# Patient Record
Sex: FEMALE | URBAN - NONMETROPOLITAN AREA
[De-identification: names, ages, dates, MRNs, and addresses within clinical notes are randomized per-mention and may not be internally consistent; named-entity substitution may affect disease eponyms.]

---

## 2024-07-15 ENCOUNTER — APPOINTMENT (OUTPATIENT)
Dept: CARDIOLOGY | Facility: CLINIC | Age: 39
End: 2024-07-15
Payer: COMMERCIAL

## 2024-07-15 VITALS
DIASTOLIC BLOOD PRESSURE: 98 MMHG | BODY MASS INDEX: 45.65 KG/M2 | HEART RATE: 94 BPM | WEIGHT: 274 LBS | HEIGHT: 65 IN | SYSTOLIC BLOOD PRESSURE: 160 MMHG

## 2024-07-15 DIAGNOSIS — Z86.59 HISTORY OF ANXIETY: ICD-10-CM

## 2024-07-15 DIAGNOSIS — L40.50 PSORIATIC ARTHRITIS (MULTI): ICD-10-CM

## 2024-07-15 DIAGNOSIS — R00.2 PALPITATIONS: ICD-10-CM

## 2024-07-15 DIAGNOSIS — Z87.891 FORMER SMOKER: ICD-10-CM

## 2024-07-15 DIAGNOSIS — I10 HYPERTENSION, UNSPECIFIED TYPE: ICD-10-CM

## 2024-07-15 DIAGNOSIS — E03.9 HYPOTHYROIDISM, UNSPECIFIED TYPE: ICD-10-CM

## 2024-07-15 PROCEDURE — 1036F TOBACCO NON-USER: CPT | Performed by: INTERNAL MEDICINE

## 2024-07-15 PROCEDURE — 93000 ELECTROCARDIOGRAM COMPLETE: CPT | Performed by: INTERNAL MEDICINE

## 2024-07-15 PROCEDURE — 3008F BODY MASS INDEX DOCD: CPT | Performed by: INTERNAL MEDICINE

## 2024-07-15 PROCEDURE — 3077F SYST BP >= 140 MM HG: CPT | Performed by: INTERNAL MEDICINE

## 2024-07-15 PROCEDURE — 3080F DIAST BP >= 90 MM HG: CPT | Performed by: INTERNAL MEDICINE

## 2024-07-15 PROCEDURE — 99204 OFFICE O/P NEW MOD 45 MIN: CPT | Performed by: INTERNAL MEDICINE

## 2024-07-15 RX ORDER — METHOTREXATE 2.5 MG/1
2.5 TABLET ORAL
COMMUNITY
Start: 2024-06-03

## 2024-07-15 RX ORDER — FOLIC ACID 1 MG/1
0.4 TABLET ORAL
COMMUNITY
Start: 2024-06-11

## 2024-07-15 RX ORDER — LANOLIN ALCOHOL/MO/W.PET/CERES
400 CREAM (GRAM) TOPICAL 2 TIMES DAILY
Qty: 180 TABLET | Refills: 3 | Status: SHIPPED | OUTPATIENT
Start: 2024-07-15 | End: 2025-07-15

## 2024-07-15 RX ORDER — METOPROLOL SUCCINATE 50 MG/1
50 TABLET, EXTENDED RELEASE ORAL DAILY
Qty: 90 TABLET | Refills: 3 | Status: SHIPPED | OUTPATIENT
Start: 2024-07-15 | End: 2025-07-15

## 2024-07-15 RX ORDER — LEVOTHYROXINE SODIUM 125 UG/1
125 TABLET ORAL DAILY
COMMUNITY
Start: 2024-06-11

## 2024-07-15 NOTE — PATIENT INSTRUCTIONS
Please bring all medicines, vitamins, and herbal supplements with you when you come to the office.    Prescriptions will not be filled unless you are compliant with your follow up appointments or have a follow up appointment scheduled as per instruction of your physician. Refills should be requested at the time of your visit.   BMI was above normal measurement. Current weight: 124 kg (274 lb)  Weight change since last visit (-) denotes wt loss 274 lbs   Weight loss needed to achieve BMI 25: 126.4 Lbs  Weight loss needed to achieve BMI 30: 96.9 Lbs  Advised to Increase physical activity.

## 2024-07-15 NOTE — PROGRESS NOTES
Cardiology Consultation- New Consult    Reason for referral: Palpitations, edema, hypertension    HPI: Alicia Casey is a 39 y.o. female accompanied by her  to the office, she has history of hypertension, which is particularly bothersome since November 2023, she admits to being under significant situational stress, both related to job and home responsibilities, she also has history of thyroid cancer and is status post thyroidectomy, with subsequent derangement in her thyroid function, she is back on thyroxine replacement, and reports that her numbers are back to normal.  Along with this she reports having significant anxiety.  She developed palpitations which she describes as a sharp throbbing sensation left lateral chest, not particularly brought on by activity or meals, sometimes occurs at night, she has not had lightheadedness presyncope or syncope.  She has also noticed different types of chest pain, sharp stabbing pain left lateral chest, and anterior chest heaviness, the anterior chest discomfort has occurred when she is in the supine position, she does not recall having symptoms of classic GERD.  She has found it difficult to lose weight and is quite frustrated, she says her p.o. intake is quite limited, she frequently has a single meal, main meal is lunch, and she has a very light meal at bedtime.  Despite this she is not able to lose weight in fact she had gained weight, in the setting of prednisone treatment for recurrent left ear infection.    I have reviewed most recent office note by Dr. Leon dated June 2024.    There is family history of hypertension in both mother and father.  Other medical history includes nicotine dependence psoriasis for which she takes methotrexate, Psoriatec arthritis, spinal stenosis of cervical region.  12 point review of systems is negative or noncontributory except as noted.    EKG was reviewed.      Past Medical History:   She has no past medical history on  "file.    Surgical History:   She has a past surgical history that includes Thyroidectomy and  section, classic.    Family History:   Family History   Problem Relation Name Age of Onset    Hypertension Mother      Heart disease Mother      Hypertension Father         Social History:   Social History     Tobacco Use    Smoking status: Former     Current packs/day: 0.00     Types: Cigarettes     Quit date:      Years since quittin.5    Smokeless tobacco: Never   Substance Use Topics    Alcohol use: Yes     Comment: social        Allergies:  Augmentin [amoxicillin-pot clavulanate]     Current Medications:    Current Outpatient Medications:     folic acid (Folvite) 1 mg tablet, Take 0.4 mg by mouth. Take on days not taking Methotrexate, Disp: , Rfl:     levothyroxine (Synthroid, Levoxyl) 125 mcg tablet, Take 1 tablet (125 mcg) by mouth early in the morning.., Disp: , Rfl:     methotrexate (Trexall) 2.5 mg tablet, Take 1 tablet (2.5 mg total) by mouth 1 (one) time per week., Disp: , Rfl:     magnesium oxide (Mag-Ox) 400 mg (241.3 mg magnesium) tablet, Take 1 tablet (400 mg) by mouth 2 times a day., Disp: 180 tablet, Rfl: 3    metoprolol succinate XL (Toprol-XL) 50 mg 24 hr tablet, Take 1 tablet (50 mg) by mouth once daily. Do not crush or chew., Disp: 90 tablet, Rfl: 3     Vitals:  Vitals:    07/15/24 1525 07/15/24 1531   BP: (!) 156/100 (!) 160/98   BP Location: Right arm Left arm   Patient Position: Sitting Sitting   Pulse: 94    Weight: 124 kg (274 lb)    Height: 1.638 m (5' 4.5\")       EKG done in office today       GENERAL APPEARANCE: Well developed, well nourished, in no acute distress.  CHEST: Symmetric and non-tender.  INTEGUMENT: Skin warm and dry, there are multiple excoriating skin lesions consistent with psoriasis.  HEENT: No gross abnormalities, no jugular venous distention no carotid bruit or scleral icterus  NECK: Supple, no JVD, no bruit. Thyroid not palpable. Carotid upstrokes " normal.  NEURO/PSHCY: Alert and oriented x3; appropriate behavior and responses and responses, with normal balance and coordination  LUNGS: Clear to auscultation bilaterally; normal respiratory effort.  HEART: Rate and rhythm regular with no evident murmur; no gallop appreciated. There are no rubs, clicks or heaves.   ABDOMEN: Soft, nontender, no masses  or bruits.   MUSCULOSKELETAL: No obvious deformity identified  EXTREMITIES: Warm  There is no edema noted.  PERIPHERAL VASCULAR: Pulses present and equally palpable; 2+ throughout.       Laboratory data July 2024 sodium 138 potassium 4 BUN 18 creatinine 0.82 GFR greater than 60 liver enzymes normal blood pressure elevated 156/100 with heart rate of 94.  BMI 46.3      Assessment and Plan:   1. Hypothyroidism, unspecified type        2. BMI 45.0-49.9, adult (Multi)        3. Former smoker        4. History of anxiety        5. Psoriatic arthritis (Multi)        6. Palpitations  Follow Up In Cardiology    Transthoracic Echo Complete    ECG 12 Lead    Holter Or Event Cardiac Monitor      7. Hypertension, unspecified type  magnesium oxide (Mag-Ox) 400 mg (241.3 mg magnesium) tablet    metoprolol succinate XL (Toprol-XL) 50 mg 24 hr tablet         Patient with uncontrolled hypertension palpitations and different types of chest pain, the anterior chest heaviness could be GI, but we have to exclude CAD.  Initially, management would consist of blood pressure control.  Will recommend metoprolol succinate 50 mg p.o. daily, and magnesium oxide 400 mg p.o. twice daily.  Proceed with echocardiogram and 48-hour Holter monitor and follow-up after that.  We talked about weight management, she is very frustrated, and may benefit from referral to the weight management clinic.  Thank you Dr. Lynch for allowing me to participate in Alicia's care, please do not hesitate to call if further questions arise  She will follow-up with me after testing.  Sincerely,    Kenia Fuentes MD  [facility]  Scribe Attestation  By signing my name below, I, Stephanie WILD LPN  , Scribe   attest that this documentation has been prepared under the direction and in the presence of Kenia Fuentes MD.    Provider Attestation - Scribe documentation    All medical record entries made by the Scribe were at my direction and personally dictated by me. I have reviewed the chart and agree that the record accurately reflects my personal performance of the history, physical exam, discussion and plan.

## 2024-07-15 NOTE — LETTER
July 15, 2024     Krzysztof Leon DO  [address] [facility] [address]    Patient: Alicia Casey   YOB: 1985   Date of Visit: 7/15/2024       Dear Dr. Krzysztof Leon, DO:    Thank you for referring Alicia Casey to me for evaluation. Below are my notes for this consultation.  If you have questions, please do not hesitate to call me. I look forward to following your patient along with you.       Sincerely,     Kenia Fuentes MD      CC: No Recipients  ______________________________________________________________________________________    Cardiology Consultation- New Consult    Reason for referral: Palpitations, edema, hypertension    HPI: Alicia Casey is a 39 y.o. female accompanied by her  to the office, she has history of hypertension, which is particularly bothersome since November 2023, she admits to being under significant situational stress, both related to job and home responsibilities, she also has history of thyroid cancer and is status post thyroidectomy, with subsequent derangement in her thyroid function, she is back on thyroxine replacement, and reports that her numbers are back to normal.  Along with this she reports having significant anxiety.  She developed palpitations which she describes as a sharp throbbing sensation left lateral chest, not particularly brought on by activity or meals, sometimes occurs at night, she has not had lightheadedness presyncope or syncope.  She has also noticed different types of chest pain, sharp stabbing pain left lateral chest, and anterior chest heaviness, the anterior chest discomfort has occurred when she is in the supine position, she does not recall having symptoms of classic GERD.  She has found it difficult to lose weight and is quite frustrated, she says her p.o. intake is quite limited, she frequently has a single meal, main meal is lunch, and she has a very light meal at bedtime.  Despite this she is not able to lose weight in  fact she had gained weight, in the setting of prednisone treatment for recurrent left ear infection.    I have reviewed most recent office note by Dr. Leon dated 2024.    There is family history of hypertension in both mother and father.  Other medical history includes nicotine dependence psoriasis for which she takes methotrexate, Psoriatec arthritis, spinal stenosis of cervical region.  12 point review of systems is negative or noncontributory except as noted.    EKG was reviewed.      Past Medical History:   She has no past medical history on file.    Surgical History:   She has a past surgical history that includes Thyroidectomy and  section, classic.    Family History:   Family History   Problem Relation Name Age of Onset   • Hypertension Mother     • Heart disease Mother     • Hypertension Father         Social History:   Social History     Tobacco Use   • Smoking status: Former     Current packs/day: 0.00     Types: Cigarettes     Quit date:      Years since quittin.5   • Smokeless tobacco: Never   Substance Use Topics   • Alcohol use: Yes     Comment: social        Allergies:  Augmentin [amoxicillin-pot clavulanate]     Current Medications:    Current Outpatient Medications:   •  folic acid (Folvite) 1 mg tablet, Take 0.4 mg by mouth. Take on days not taking Methotrexate, Disp: , Rfl:   •  levothyroxine (Synthroid, Levoxyl) 125 mcg tablet, Take 1 tablet (125 mcg) by mouth early in the morning.., Disp: , Rfl:   •  methotrexate (Trexall) 2.5 mg tablet, Take 1 tablet (2.5 mg total) by mouth 1 (one) time per week., Disp: , Rfl:   •  magnesium oxide (Mag-Ox) 400 mg (241.3 mg magnesium) tablet, Take 1 tablet (400 mg) by mouth 2 times a day., Disp: 180 tablet, Rfl: 3  •  metoprolol succinate XL (Toprol-XL) 50 mg 24 hr tablet, Take 1 tablet (50 mg) by mouth once daily. Do not crush or chew., Disp: 90 tablet, Rfl: 3     Vitals:  Vitals:    07/15/24 1525 07/15/24 1531   BP: (!) 156/100 (!) 160/98  "  BP Location: Right arm Left arm   Patient Position: Sitting Sitting   Pulse: 94    Weight: 124 kg (274 lb)    Height: 1.638 m (5' 4.5\")       EKG done in office today       GENERAL APPEARANCE: Well developed, well nourished, in no acute distress.  CHEST: Symmetric and non-tender.  INTEGUMENT: Skin warm and dry, there are multiple excoriating skin lesions consistent with psoriasis.  HEENT: No gross abnormalities, no jugular venous distention no carotid bruit or scleral icterus  NECK: Supple, no JVD, no bruit. Thyroid not palpable. Carotid upstrokes normal.  NEURO/PSHCY: Alert and oriented x3; appropriate behavior and responses and responses, with normal balance and coordination  LUNGS: Clear to auscultation bilaterally; normal respiratory effort.  HEART: Rate and rhythm regular with no evident murmur; no gallop appreciated. There are no rubs, clicks or heaves.   ABDOMEN: Soft, nontender, no masses  or bruits.   MUSCULOSKELETAL: No obvious deformity identified  EXTREMITIES: Warm  There is no edema noted.  PERIPHERAL VASCULAR: Pulses present and equally palpable; 2+ throughout.       Laboratory data July 2024 sodium 138 potassium 4 BUN 18 creatinine 0.82 GFR greater than 60 liver enzymes normal blood pressure elevated 156/100 with heart rate of 94.  BMI 46.3      Assessment and Plan:   1. Hypothyroidism, unspecified type        2. BMI 45.0-49.9, adult (Multi)        3. Former smoker        4. History of anxiety        5. Psoriatic arthritis (Multi)        6. Palpitations  Follow Up In Cardiology    Transthoracic Echo Complete    ECG 12 Lead    Holter Or Event Cardiac Monitor      7. Hypertension, unspecified type  magnesium oxide (Mag-Ox) 400 mg (241.3 mg magnesium) tablet    metoprolol succinate XL (Toprol-XL) 50 mg 24 hr tablet         Patient with uncontrolled hypertension palpitations and different types of chest pain, the anterior chest heaviness could be GI, but we have to exclude CAD.  Initially, management " would consist of blood pressure control.  Will recommend metoprolol succinate 50 mg p.o. daily, and magnesium oxide 400 mg p.o. twice daily.  Proceed with echocardiogram and 48-hour Holter monitor and follow-up after that.  We talked about weight management, she is very frustrated, and may benefit from referral to the weight management clinic.  Thank you Dr. Lynch for allowing me to participate in Alicia's care, please do not hesitate to call if further questions arise  She will follow-up with me after testing.  Sincerely,    Kenia Fuentes MD [facility]  Scribe Attestation  By signing my name below, I, Stephanie WILD LPN  , Scribe   attest that this documentation has been prepared under the direction and in the presence of Kenia Fuentes MD.    Provider Attestation - Scribe documentation    All medical record entries made by the Scribe were at my direction and personally dictated by me. I have reviewed the chart and agree that the record accurately reflects my personal performance of the history, physical exam, discussion and plan.

## 2024-07-16 ENCOUNTER — TELEPHONE (OUTPATIENT)
Dept: CARDIOLOGY | Facility: CLINIC | Age: 39
End: 2024-07-16
Payer: COMMERCIAL

## 2024-07-16 NOTE — TELEPHONE ENCOUNTER
Patient phoned states after taking first dose of Metoprolol she had a headache, felt fatigued and dizzy. Has been having leg swelling while at work states that it is more severe than previous days. Would like to know if she should continue with current medications and dosing. Please advise      To Dr. Kenia Fuentes MD for review.

## 2024-07-25 ENCOUNTER — APPOINTMENT (OUTPATIENT)
Dept: CARDIOLOGY | Facility: CLINIC | Age: 39
End: 2024-07-25
Payer: COMMERCIAL

## 2024-07-25 DIAGNOSIS — R00.2 PALPITATIONS: ICD-10-CM

## 2024-07-25 PROCEDURE — 93225 XTRNL ECG REC<48 HRS REC: CPT | Performed by: INTERNAL MEDICINE

## 2024-07-25 PROCEDURE — 93227 XTRNL ECG REC<48 HR R&I: CPT | Performed by: INTERNAL MEDICINE

## 2024-07-31 ENCOUNTER — TELEPHONE (OUTPATIENT)
Dept: CARDIOLOGY | Facility: CLINIC | Age: 39
End: 2024-07-31
Payer: COMMERCIAL

## 2024-07-31 NOTE — TELEPHONE ENCOUNTER
Called patient and left voicemail regarding holter monitor. Patient had 48hr monitor applied on 7/25/24 that has not been returned to office yet. Informed patient on voicemail to call office back and return monitor as soon as possible.

## 2024-08-19 ENCOUNTER — HOSPITAL ENCOUNTER (OUTPATIENT)
Dept: CARDIOLOGY | Facility: CLINIC | Age: 39
Discharge: HOME | End: 2024-08-19
Payer: COMMERCIAL

## 2024-08-19 VITALS
SYSTOLIC BLOOD PRESSURE: 160 MMHG | WEIGHT: 274 LBS | BODY MASS INDEX: 46.78 KG/M2 | DIASTOLIC BLOOD PRESSURE: 92 MMHG | HEIGHT: 64 IN

## 2024-08-19 DIAGNOSIS — R00.2 PALPITATIONS: ICD-10-CM

## 2024-08-19 LAB
AORTIC VALVE MEAN GRADIENT: 3 MMHG
AORTIC VALVE PEAK VELOCITY: 1.2 M/S
AV PEAK GRADIENT: 5.8 MMHG
AVA (PEAK VEL): 2.11 CM2
AVA (VTI): 2.14 CM2
EJECTION FRACTION: 63 %
LEFT VENTRICLE INTERNAL DIMENSION DIASTOLE: 4.22 CM (ref 3.5–6)
LEFT VENTRICULAR OUTFLOW TRACT DIAMETER: 2.1 CM
MITRAL VALVE E/A RATIO: 0.87
MITRAL VALVE E/E' RATIO: 5.7

## 2024-08-19 PROCEDURE — 93306 TTE W/DOPPLER COMPLETE: CPT

## 2024-08-19 PROCEDURE — 93306 TTE W/DOPPLER COMPLETE: CPT | Performed by: INTERNAL MEDICINE

## 2024-08-26 ENCOUNTER — APPOINTMENT (OUTPATIENT)
Dept: CARDIOLOGY | Facility: CLINIC | Age: 39
End: 2024-08-26
Payer: COMMERCIAL

## 2024-08-26 VITALS
HEIGHT: 64 IN | HEART RATE: 80 BPM | BODY MASS INDEX: 46.85 KG/M2 | DIASTOLIC BLOOD PRESSURE: 82 MMHG | WEIGHT: 274.4 LBS | SYSTOLIC BLOOD PRESSURE: 130 MMHG

## 2024-08-26 DIAGNOSIS — Z79.899 MEDICATION COURSE CHANGED: ICD-10-CM

## 2024-08-26 DIAGNOSIS — Z71.2 ENCOUNTER TO DISCUSS TEST RESULTS: Primary | ICD-10-CM

## 2024-08-26 DIAGNOSIS — R00.2 PALPITATIONS: ICD-10-CM

## 2024-08-26 DIAGNOSIS — Z87.891 FORMER SMOKER: ICD-10-CM

## 2024-08-26 DIAGNOSIS — I10 PRIMARY HYPERTENSION: ICD-10-CM

## 2024-08-26 PROCEDURE — 99214 OFFICE O/P EST MOD 30 MIN: CPT | Performed by: INTERNAL MEDICINE

## 2024-08-26 PROCEDURE — 3075F SYST BP GE 130 - 139MM HG: CPT | Performed by: INTERNAL MEDICINE

## 2024-08-26 PROCEDURE — 1036F TOBACCO NON-USER: CPT | Performed by: INTERNAL MEDICINE

## 2024-08-26 PROCEDURE — 3008F BODY MASS INDEX DOCD: CPT | Performed by: INTERNAL MEDICINE

## 2024-08-26 PROCEDURE — 3079F DIAST BP 80-89 MM HG: CPT | Performed by: INTERNAL MEDICINE

## 2024-08-26 RX ORDER — METOPROLOL SUCCINATE 100 MG/1
100 TABLET, EXTENDED RELEASE ORAL DAILY
Qty: 90 TABLET | Refills: 3 | Status: SHIPPED | OUTPATIENT
Start: 2024-08-26 | End: 2025-08-26

## 2024-08-26 NOTE — PATIENT INSTRUCTIONS
Please bring all medicines, vitamins, and herbal supplements with you when you come to the office.    Prescriptions will not be filled unless you are compliant with your follow up appointments or have a follow up appointment scheduled as per instruction of your physician. Refills should be requested at the time of your visit.     BMI was above normal measurement. Current weight: 124 kg (274 lb 6.4 oz)  Weight change since last visit (-) denotes wt loss 0.4 lbs   Weight loss needed to achieve BMI 25: 129.1 Lbs  Weight loss needed to achieve BMI 30: 100 Lbs  Provided instructions on dietary changes  Provided instructions on exercise.

## 2024-08-26 NOTE — PROGRESS NOTES
"  Patient was initially seen in July 2024 to evaluate palpitations edema and hypertension.  Reordered Holter monitor echocardiogram, metoprolol succinate and magnesium were initiated and she presents for follow-up.  Subjective :   Reports no improvement in palpitations with initiation of Toprol-XL and magnesium.  Continues to have flip-flopping sensation.  Echocardiogram was essentially normal.  Holter monitor was also normal.  Continued frustration about inability to lose weight.      History so Far :  1.  Palpitations  2.  History of thyroid cancer status post thyroidectomy, on levothyroxine supplementation  3.  Situational stress  4.  Increased BMI of 47 on 8/26/2024 with inability to lose weight with routine lifestyle modification  5.  Nicotine dependence  6.  Psoriatec arthritis, on methotrexate  7.  Echocardiogram August 2024-LVEF 60 to 65% normal pattern of LV diastolic filling normal chamber dimensions normal RV size and systolic function left atrial diameter 3.2 cm grossly normal valves.  PA pressure could not be estimated.  8.  Holter monitor July 2024-minimum sinus rate 49 bpm average heart rate 72 bpm maximum sinus rate 124 bpm 1 supraventricular premature beat longest R to R interval 1.3 seconds.  Objective   Wt Readings from Last 3 Encounters:   08/26/24 124 kg (274 lb 6.4 oz)   08/19/24 124 kg (274 lb)   07/15/24 124 kg (274 lb)            Vitals:    08/26/24 1532 08/26/24 1634   BP: 148/88 130/82   BP Location: Left arm Right arm   Patient Position: Sitting Sitting   Pulse: 80    Weight: 124 kg (274 lb 6.4 oz)    Height: 1.626 m (5' 4\")                 Physical Exam:  Blood pressure was initially elevated, recheck blood pressure is in target.  GENERAL APPEARANCE: in no acute distress.  CHEST: Symmetric and non-tender.  INTEGUMENT: Skin warm and dry  HEENT: No gross abnormalities identified.No pallor or scleral icterus.  NECK: Supple, no JVD, no bruit.   NEURO/PSHCY: Alert and oriented x3; appropriate " "behavior and responses and responses  LUNGS: Clear to auscultation bilaterally; normal respiratory effort.  HEART: Rate and rhythm regular with no evident murmur; no gallop appreciated.   ABDOMEN: Soft, non tender.  MUSCULOSKELETAL: No gross deformities.  EXTREMITIES: Warm  There is no edema noted.    Meds:  Current Outpatient Medications   Medication Instructions    folic acid (FOLVITE) 0.4 mg, oral, Take on days not taking Methotrexate    levothyroxine (SYNTHROID, LEVOXYL) 125 mcg, oral, Daily    magnesium oxide (MAG-OX) 400 mg, oral, 2 times daily    methotrexate (TREXALL) 2.5 mg, oral, Once Weekly    metoprolol succinate XL (TOPROL XL) 100 mg, oral, Daily, Do not crush or chew.          Allergies   Allergen Reactions    Augmentin [Amoxicillin-Pot Clavulanate] Other     Thrush, facial twitching             LABS:    No results found for: \"WBC\", \"HGB\", \"HCT\", \"PLT\", \"CHOL\", \"TRIG\", \"HDL\", \"LDLDIRECT\", \"ALT\", \"AST\", \"NA\", \"K\", \"CL\", \"CREATININE\", \"BUN\", \"CO2\", \"TSH\", \"PSA\", \"INR\", \"GLUF\", \"HGBA1C\", \"ALBUR\"                    Patient Active Problem List    Diagnosis Date Noted    Encounter to discuss test results 08/26/2024    Hypothyroid 07/15/2024    BMI 45.0-49.9, adult (Multi) 07/15/2024    Former smoker 07/15/2024    History of anxiety 07/15/2024    Psoriatic arthritis (Multi) 07/15/2024    Palpitations 07/15/2024    Hypertension 07/15/2024                 Assessment:    1. Encounter to discuss test results        2. Palpitations  Follow Up In Cardiology    metoprolol succinate XL (Toprol XL) 100 mg 24 hr tablet    Holter Or Event Cardiac Monitor      3. Primary hypertension  Follow Up In Cardiology      4. BMI 45.0-49.9, adult (Multi)  Referral to Nutrition Services      5. Former smoker        6. Medication course changed        Patient believes that the etiology of palpitations may not have been captured on the Holter.  We will proceed with extended monitoring, close 2 weeks.  We will also increase her " metoprolol succinate from 50 mg daily to 100 mg daily.  Referral to the weight loss clinic was initiated.    Follow up : after testing        Provider Attestation - Scribe documentation    All medical record entries made by the Scribe were at my direction and personally dictated by me. I have reviewed the chart and agree that the record accurately reflects my personal performance of the history, physical exam, discussion and plan.

## 2024-08-26 NOTE — LETTER
August 26, 2024     Krzysztof Leon DO  [address] [facility] [address]    Patient: Alicia Casey   YOB: 1985   Date of Visit: 8/26/2024       Dear Dr. Krzysztof Leon, DO:    Thank you for referring Alicia Casey to me for evaluation. Below are my notes for this consultation.  If you have questions, please do not hesitate to call me. I look forward to following your patient along with you.       Sincerely,     Kenia Fuentes MD      CC: No Recipients  ______________________________________________________________________________________      Patient was initially seen in July 2024 to evaluate palpitations edema and hypertension.  Reordered Holter monitor echocardiogram, metoprolol succinate and magnesium were initiated and she presents for follow-up.  Subjective :   Reports no improvement in palpitations with initiation of Toprol-XL and magnesium.  Continues to have flip-flopping sensation.  Echocardiogram was essentially normal.  Holter monitor was also normal.  Continued frustration about inability to lose weight.      History so Far :  1.  Palpitations  2.  History of thyroid cancer status post thyroidectomy, on levothyroxine supplementation  3.  Situational stress  4.  Increased BMI of 47 on 8/26/2024 with inability to lose weight with routine lifestyle modification  5.  Nicotine dependence  6.  Psoriatec arthritis, on methotrexate  7.  Echocardiogram August 2024-LVEF 60 to 65% normal pattern of LV diastolic filling normal chamber dimensions normal RV size and systolic function left atrial diameter 3.2 cm grossly normal valves.  PA pressure could not be estimated.  8.  Holter monitor July 2024-minimum sinus rate 49 bpm average heart rate 72 bpm maximum sinus rate 124 bpm 1 supraventricular premature beat longest R to R interval 1.3 seconds.  Objective   Wt Readings from Last 3 Encounters:   08/26/24 124 kg (274 lb 6.4 oz)   08/19/24 124 kg (274 lb)   07/15/24 124 kg (274 lb)     "        Vitals:    08/26/24 1532 08/26/24 1634   BP: 148/88 130/82   BP Location: Left arm Right arm   Patient Position: Sitting Sitting   Pulse: 80    Weight: 124 kg (274 lb 6.4 oz)    Height: 1.626 m (5' 4\")                 Physical Exam:  Blood pressure was initially elevated, recheck blood pressure is in target.  GENERAL APPEARANCE: in no acute distress.  CHEST: Symmetric and non-tender.  INTEGUMENT: Skin warm and dry  HEENT: No gross abnormalities identified.No pallor or scleral icterus.  NECK: Supple, no JVD, no bruit.   NEURO/PSHCY: Alert and oriented x3; appropriate behavior and responses and responses  LUNGS: Clear to auscultation bilaterally; normal respiratory effort.  HEART: Rate and rhythm regular with no evident murmur; no gallop appreciated.   ABDOMEN: Soft, non tender.  MUSCULOSKELETAL: No gross deformities.  EXTREMITIES: Warm  There is no edema noted.    Meds:  Current Outpatient Medications   Medication Instructions   • folic acid (FOLVITE) 0.4 mg, oral, Take on days not taking Methotrexate   • levothyroxine (SYNTHROID, LEVOXYL) 125 mcg, oral, Daily   • magnesium oxide (MAG-OX) 400 mg, oral, 2 times daily   • methotrexate (TREXALL) 2.5 mg, oral, Once Weekly   • metoprolol succinate XL (TOPROL XL) 100 mg, oral, Daily, Do not crush or chew.          Allergies   Allergen Reactions   • Augmentin [Amoxicillin-Pot Clavulanate] Other     Thrush, facial twitching             LABS:    No results found for: \"WBC\", \"HGB\", \"HCT\", \"PLT\", \"CHOL\", \"TRIG\", \"HDL\", \"LDLDIRECT\", \"ALT\", \"AST\", \"NA\", \"K\", \"CL\", \"CREATININE\", \"BUN\", \"CO2\", \"TSH\", \"PSA\", \"INR\", \"GLUF\", \"HGBA1C\", \"ALBUR\"                    Patient Active Problem List    Diagnosis Date Noted   • Encounter to discuss test results 08/26/2024   • Hypothyroid 07/15/2024   • BMI 45.0-49.9, adult (Multi) 07/15/2024   • Former smoker 07/15/2024   • History of anxiety 07/15/2024   • Psoriatic arthritis (Multi) 07/15/2024   • Palpitations 07/15/2024   • " Hypertension 07/15/2024                 Assessment:    1. Encounter to discuss test results        2. Palpitations  Follow Up In Cardiology    metoprolol succinate XL (Toprol XL) 100 mg 24 hr tablet    Holter Or Event Cardiac Monitor      3. Primary hypertension  Follow Up In Cardiology      4. BMI 45.0-49.9, adult (Multi)  Referral to Nutrition Services      5. Former smoker        6. Medication course changed        Patient believes that the etiology of palpitations may not have been captured on the Holter.  We will proceed with extended monitoring, close 2 weeks.  We will also increase her metoprolol succinate from 50 mg daily to 100 mg daily.  Referral to the weight loss clinic was initiated.    Follow up : after testing        Provider Attestation - Scribe documentation    All medical record entries made by the Scribe were at my direction and personally dictated by me. I have reviewed the chart and agree that the record accurately reflects my personal performance of the history, physical exam, discussion and plan.

## 2024-09-13 ENCOUNTER — APPOINTMENT (OUTPATIENT)
Dept: CARDIOLOGY | Facility: CLINIC | Age: 39
End: 2024-09-13
Payer: COMMERCIAL

## 2025-02-24 ENCOUNTER — TELEPHONE (OUTPATIENT)
Dept: CARDIOLOGY | Facility: CLINIC | Age: 40
End: 2025-02-24

## 2025-02-24 ENCOUNTER — APPOINTMENT (OUTPATIENT)
Dept: CARDIOLOGY | Facility: CLINIC | Age: 40
End: 2025-02-24
Payer: COMMERCIAL

## 2025-04-14 ENCOUNTER — TELEPHONE (OUTPATIENT)
Dept: CARDIOLOGY | Facility: CLINIC | Age: 40
End: 2025-04-14
Payer: COMMERCIAL